# Patient Record
Sex: FEMALE | Race: BLACK OR AFRICAN AMERICAN | NOT HISPANIC OR LATINO | URBAN - METROPOLITAN AREA
[De-identification: names, ages, dates, MRNs, and addresses within clinical notes are randomized per-mention and may not be internally consistent; named-entity substitution may affect disease eponyms.]

---

## 2022-06-09 ENCOUNTER — OFFICE VISIT (OUTPATIENT)
Dept: URBAN - METROPOLITAN AREA CLINIC 118 | Facility: CLINIC | Age: 77
End: 2022-06-09
Payer: MEDICARE

## 2022-06-09 ENCOUNTER — LAB OUTSIDE AN ENCOUNTER (OUTPATIENT)
Dept: URBAN - METROPOLITAN AREA CLINIC 118 | Facility: CLINIC | Age: 77
End: 2022-06-09

## 2022-06-09 VITALS
DIASTOLIC BLOOD PRESSURE: 77 MMHG | WEIGHT: 189.2 LBS | BODY MASS INDEX: 31.52 KG/M2 | HEIGHT: 65 IN | HEART RATE: 56 BPM | TEMPERATURE: 97.7 F | SYSTOLIC BLOOD PRESSURE: 175 MMHG

## 2022-06-09 DIAGNOSIS — R10.30 LOWER ABDOMINAL PAIN: ICD-10-CM

## 2022-06-09 DIAGNOSIS — K62.5 RECTAL BLEEDING: ICD-10-CM

## 2022-06-09 PROCEDURE — 99214 OFFICE O/P EST MOD 30 MIN: CPT | Performed by: INTERNAL MEDICINE

## 2022-06-09 RX ORDER — DEXAMETHASONE 0.5 MG/1
1 TABLET TABLET ORAL
Status: ACTIVE | COMMUNITY

## 2022-06-09 RX ORDER — ALBUTEROL SULFATE 90 UG/1
AEROSOL, METERED RESPIRATORY (INHALATION)
Qty: 0 | Refills: 0 | Status: ACTIVE | COMMUNITY
Start: 1900-01-01

## 2022-06-09 NOTE — HPI-TODAY'S VISIT:
Reports having intermittent abdominal pain around Mother's day last month.   She had one episode of blood in the stools following the abdominal pain.   Feels low grade mid abdominal pain   No constipation, diarrhea, weight loss, vomiting.   States she was recently diagnosed with lichen planus for oral lesion

## 2022-06-14 LAB
A/G RATIO: 1.5
ALBUMIN: 4.2
ALKALINE PHOSPHATASE: 88
ALT (SGPT): 14
AST (SGOT): 17
BILIRUBIN, TOTAL: <0.2
BUN/CREATININE RATIO: 19
BUN: 17
CALCIUM: 9.7
CARBON DIOXIDE, TOTAL: 26
CHLORIDE: 100
CREATININE: 0.88
EGFR: 67
GLOBULIN, TOTAL: 2.8
GLUCOSE: 99
HEMATOCRIT: 45.3
HEMOGLOBIN: 13.5
MCH: 21.5
MCHC: 29.8
MCV: 72
NRBC: (no result)
PLATELETS: 300
POTASSIUM: 4.3
PROTEIN, TOTAL: 7
RBC: 6.29
RDW: 16.3
SODIUM: 142
WBC: 6.1

## 2022-06-29 ENCOUNTER — TELEPHONE ENCOUNTER (OUTPATIENT)
Dept: URBAN - METROPOLITAN AREA CLINIC 92 | Facility: CLINIC | Age: 77
End: 2022-06-29

## 2022-06-29 ENCOUNTER — OFFICE VISIT (OUTPATIENT)
Dept: URBAN - METROPOLITAN AREA SURGERY CENTER 23 | Facility: SURGERY CENTER | Age: 77
End: 2022-06-29

## 2022-06-29 RX ORDER — DEXAMETHASONE 0.5 MG/1
1 TABLET TABLET ORAL
Status: ACTIVE | COMMUNITY

## 2022-06-29 RX ORDER — ALBUTEROL SULFATE 90 UG/1
AEROSOL, METERED RESPIRATORY (INHALATION)
Qty: 0 | Refills: 0 | Status: ACTIVE | COMMUNITY
Start: 1900-01-01

## 2023-02-06 ENCOUNTER — LAB OUTSIDE AN ENCOUNTER (OUTPATIENT)
Dept: URBAN - METROPOLITAN AREA CLINIC 52 | Facility: CLINIC | Age: 78
End: 2023-02-06

## 2023-02-06 ENCOUNTER — CLAIMS CREATED FROM THE CLAIM WINDOW (OUTPATIENT)
Dept: URBAN - METROPOLITAN AREA CLINIC 52 | Facility: CLINIC | Age: 78
End: 2023-02-06
Payer: MEDICARE

## 2023-02-06 ENCOUNTER — WEB ENCOUNTER (OUTPATIENT)
Dept: URBAN - METROPOLITAN AREA CLINIC 52 | Facility: CLINIC | Age: 78
End: 2023-02-06

## 2023-02-06 VITALS
WEIGHT: 175.8 LBS | SYSTOLIC BLOOD PRESSURE: 146 MMHG | HEIGHT: 65 IN | TEMPERATURE: 97 F | OXYGEN SATURATION: 97 % | BODY MASS INDEX: 29.29 KG/M2 | HEART RATE: 96 BPM | DIASTOLIC BLOOD PRESSURE: 98 MMHG

## 2023-02-06 DIAGNOSIS — R93.5 ABNORMAL CT OF THE ABDOMEN: ICD-10-CM

## 2023-02-06 DIAGNOSIS — K62.5 RECTAL BLEEDING: ICD-10-CM

## 2023-02-06 DIAGNOSIS — K57.90 DIVERTICULOSIS: ICD-10-CM

## 2023-02-06 DIAGNOSIS — K76.0 FATTY LIVER: ICD-10-CM

## 2023-02-06 PROBLEM — 197321007: Status: ACTIVE | Noted: 2023-02-06

## 2023-02-06 PROCEDURE — 99204 OFFICE O/P NEW MOD 45 MIN: CPT | Performed by: INTERNAL MEDICINE

## 2023-02-06 PROCEDURE — 99214 OFFICE O/P EST MOD 30 MIN: CPT | Performed by: INTERNAL MEDICINE

## 2023-02-06 RX ORDER — ALBUTEROL SULFATE 90 UG/1
AEROSOL, METERED RESPIRATORY (INHALATION)
Qty: 0 | Refills: 0 | Status: ON HOLD | COMMUNITY
Start: 1900-01-01

## 2023-02-06 RX ORDER — POLYETHYLENE GLYCOL 3350, SODIUM CHLORIDE, SODIUM BICARBONATE, POTASSIUM CHLORIDE 420; 11.2; 5.72; 1.48 G/4L; G/4L; G/4L; G/4L
AS DIRECTED POWDER, FOR SOLUTION ORAL
Qty: 420 | Refills: 0 | OUTPATIENT
Start: 2023-02-06 | End: 2023-02-07

## 2023-02-06 RX ORDER — SPIRONOLACTONE 25 MG/1
1 TABLET TABLET, FILM COATED ORAL
Status: ACTIVE | COMMUNITY

## 2023-02-06 RX ORDER — DEXAMETHASONE 0.5 MG/1
1 TABLET TABLET ORAL
Status: ON HOLD | COMMUNITY

## 2023-02-06 RX ORDER — ONDANSETRON 4 MG/1
1 TABLET ON THE TONGUE AND ALLOW TO DISSOLVE TABLET, ORALLY DISINTEGRATING ORAL ONCE A DAY
Status: ACTIVE | COMMUNITY

## 2023-02-06 RX ORDER — APREMILAST 30 MG/1
1 TABLET TABLET, FILM COATED ORAL TWICE A DAY
Status: ACTIVE | COMMUNITY

## 2023-02-06 RX ORDER — EZETIMIBE 10 MG/1
1 TABLET TABLET ORAL ONCE A DAY
Status: ACTIVE | COMMUNITY

## 2023-02-06 RX ORDER — OLMESARTAN MEDOXOMIL 20 MG/1
1 TABLET TABLET, FILM COATED ORAL ONCE A DAY
Status: ACTIVE | COMMUNITY

## 2023-02-06 NOTE — HPI-TODAY'S VISIT:
12/21/22-Admitted hosp for rectal bleeding which ceased. At this time, no more bleeding or abd pain. CT-diverticulosis with mild stranding. Reports having intermittent abdominal pain for a year   Feels low grade mid abdominal pain  No constipation, diarrhea, weight loss, vomiting.  Recently diagnosed with lichen planus for oral lesion 2005-Mbganfpguky-HE x divertics [id number]-[id number]-LB. Pan divertics. 2019-EGD-Minor gastritis/duodenitis. 2019-MRI liver-2 probable hemangioma.

## 2023-02-08 PROBLEM — 397881000: Status: ACTIVE | Noted: 2023-02-06

## 2023-03-15 ENCOUNTER — OFFICE VISIT (OUTPATIENT)
Dept: URBAN - METROPOLITAN AREA SURGERY CENTER 17 | Facility: SURGERY CENTER | Age: 78
End: 2023-03-15

## 2023-06-13 ENCOUNTER — TELEPHONE ENCOUNTER (OUTPATIENT)
Dept: URBAN - METROPOLITAN AREA CLINIC 52 | Facility: CLINIC | Age: 78
End: 2023-06-13

## 2023-06-20 ENCOUNTER — OFFICE VISIT (OUTPATIENT)
Dept: URBAN - METROPOLITAN AREA CLINIC 17 | Facility: CLINIC | Age: 78
End: 2023-06-20

## 2023-06-20 RX ORDER — OLMESARTAN MEDOXOMIL 20 MG/1
1 TABLET TABLET, FILM COATED ORAL ONCE A DAY
COMMUNITY

## 2023-06-20 RX ORDER — APREMILAST 30 MG/1
1 TABLET TABLET, FILM COATED ORAL TWICE A DAY
COMMUNITY

## 2023-06-20 RX ORDER — EZETIMIBE 10 MG/1
1 TABLET TABLET ORAL ONCE A DAY
COMMUNITY

## 2023-06-20 RX ORDER — DEXAMETHASONE 0.5 MG/1
1 TABLET TABLET ORAL
Status: ON HOLD | COMMUNITY

## 2023-06-20 RX ORDER — ONDANSETRON 4 MG/1
1 TABLET ON THE TONGUE AND ALLOW TO DISSOLVE TABLET, ORALLY DISINTEGRATING ORAL ONCE A DAY
COMMUNITY

## 2023-06-20 RX ORDER — ALBUTEROL SULFATE 90 UG/1
AEROSOL, METERED RESPIRATORY (INHALATION)
Qty: 0 | Refills: 0 | Status: ON HOLD | COMMUNITY
Start: 1900-01-01

## 2023-06-20 RX ORDER — SPIRONOLACTONE 25 MG/1
1 TABLET TABLET, FILM COATED ORAL
COMMUNITY

## 2023-10-03 ENCOUNTER — LAB OUTSIDE AN ENCOUNTER (OUTPATIENT)
Dept: URBAN - METROPOLITAN AREA CLINIC 17 | Facility: CLINIC | Age: 78
End: 2023-10-03

## 2023-10-03 ENCOUNTER — OFFICE VISIT (OUTPATIENT)
Dept: URBAN - METROPOLITAN AREA CLINIC 17 | Facility: CLINIC | Age: 78
End: 2023-10-03
Payer: MEDICARE

## 2023-10-03 VITALS
DIASTOLIC BLOOD PRESSURE: 81 MMHG | SYSTOLIC BLOOD PRESSURE: 149 MMHG | BODY MASS INDEX: 28.66 KG/M2 | TEMPERATURE: 97.2 F | HEART RATE: 60 BPM | HEIGHT: 65 IN | WEIGHT: 172 LBS

## 2023-10-03 DIAGNOSIS — R93.5 ABNORMAL CT OF THE ABDOMEN: ICD-10-CM

## 2023-10-03 DIAGNOSIS — R63.4 WEIGHT LOSS: ICD-10-CM

## 2023-10-03 DIAGNOSIS — K62.5 RECTAL BLEEDING: ICD-10-CM

## 2023-10-03 DIAGNOSIS — K57.90 DIVERTICULOSIS: ICD-10-CM

## 2023-10-03 DIAGNOSIS — R19.7 DIARRHEA, UNSPECIFIED TYPE: ICD-10-CM

## 2023-10-03 DIAGNOSIS — R10.30 LOWER ABDOMINAL PAIN: ICD-10-CM

## 2023-10-03 PROCEDURE — 99215 OFFICE O/P EST HI 40 MIN: CPT | Performed by: INTERNAL MEDICINE

## 2023-10-03 RX ORDER — SPIRONOLACTONE 25 MG/1
1 TABLET TABLET, FILM COATED ORAL
Status: ACTIVE | COMMUNITY

## 2023-10-03 RX ORDER — OLMESARTAN MEDOXOMIL 20 MG/1
1 TABLET TABLET, FILM COATED ORAL ONCE A DAY
Status: ACTIVE | COMMUNITY

## 2023-10-03 RX ORDER — ALBUTEROL SULFATE 90 UG/1
AEROSOL, METERED RESPIRATORY (INHALATION)
Qty: 0 | Refills: 0 | Status: ON HOLD | COMMUNITY
Start: 1900-01-01

## 2023-10-03 RX ORDER — APREMILAST 30 MG/1
1 TABLET TABLET, FILM COATED ORAL TWICE A DAY
Status: ON HOLD | COMMUNITY

## 2023-10-03 RX ORDER — DEXAMETHASONE 0.5 MG/1
1 TABLET TABLET ORAL
Status: ACTIVE | COMMUNITY

## 2023-10-03 RX ORDER — EZETIMIBE 10 MG/1
1 TABLET TABLET ORAL ONCE A DAY
Status: ACTIVE | COMMUNITY

## 2023-10-03 NOTE — PHYSICAL EXAM RECTAL:
normal tone, no external hemorrhoids, no masses palpable, no red blood, Tenderness on DOLORES, Internal hemorrhoids present

## 2023-10-03 NOTE — HPI-TODAY'S VISIT:
Patient was in today had Preventice 30 day patch placed per Dr Sonya Kline. Patient given instructions and questions answered, patient verbalized understanding.       Estrella Aschoff, [place] This is an 77 yo female here for follow up of a December hospitalization at [facility].  She followed up with Dr. Romero February 2023.  A colonoscopy was scheduled but not performed.  The patient reports lower abdominal pain since December.  The severe abdominal pain has resolved.  She reports diarrhea and a change in her bowel habits since the bout of diverticulitis.  Stool evacuation has increased from one formed stool daily to 3 loose stools daily with urgency and occurs immediately after liquids and solids.  She has lost about 20 pounds since that time.  She denies rectal bleeding.   She alos reports abdominal pain with meals.  She admits to early satiety thus consumes small meals.

## 2023-10-04 LAB
ALBUMIN/GLOBULIN RATIO: 1.5
ALBUMIN: 4
ALKALINE PHOSPHATASE: 136
ALT: 14
AST: 15
BILIRUBIN, TOTAL: 0.4
BUN/CREATININE RATIO: (no result)
CA 19-9: 37
CALCIUM: 9.6
CARBON DIOXIDE: 31
CHLORIDE: 104
CREATININE: 0.77
GLOBULIN: 2.7
GLUCOSE: 87
HEMATOCRIT: 41.6
HEMOGLOBIN: 12.9
MCH: 21.8
MCHC: 31
MCV: 70.4
MPV: 10.9
PLATELET COUNT: 255
POTASSIUM: 5.1
PROTEIN, TOTAL: 6.7
RDW: 15
RED BLOOD CELL COUNT: 5.91
SODIUM: 141
UREA NITROGEN (BUN): 11
WHITE BLOOD CELL COUNT: 5.4

## 2023-12-08 ENCOUNTER — OFFICE VISIT (OUTPATIENT)
Dept: URBAN - METROPOLITAN AREA SURGERY CENTER 16 | Facility: SURGERY CENTER | Age: 78
End: 2023-12-08
Payer: MEDICARE

## 2023-12-08 ENCOUNTER — CLAIMS CREATED FROM THE CLAIM WINDOW (OUTPATIENT)
Dept: URBAN - METROPOLITAN AREA CLINIC 4 | Facility: CLINIC | Age: 78
End: 2023-12-08
Payer: MEDICARE

## 2023-12-08 DIAGNOSIS — R10.84 GENERALIZED ABDOMINAL PAIN: ICD-10-CM

## 2023-12-08 DIAGNOSIS — Z09 ENCOUNTER FOR FOLLOW-UP EXAMINATION AFTER COMPLETED TREATMENT FOR CONDITIONS OTHER THAN MALIGNANT NEOPLASM: ICD-10-CM

## 2023-12-08 DIAGNOSIS — K63.89 OTHER SPECIFIED DISEASES OF INTESTINE: ICD-10-CM

## 2023-12-08 DIAGNOSIS — R63.4 ABNORMAL WEIGHT LOSS: ICD-10-CM

## 2023-12-08 DIAGNOSIS — K31.89 OTHER DISEASES OF STOMACH AND DUODENUM: ICD-10-CM

## 2023-12-08 DIAGNOSIS — K57.30 DIVERTICULA OF COLON: ICD-10-CM

## 2023-12-08 DIAGNOSIS — K63.89 MELANOSIS, COLON: ICD-10-CM

## 2023-12-08 DIAGNOSIS — K57.32 DIVERTICULITIS OF LARGE INTESTINE WITHOUT PERFORATION OR ABSCESS WITHOUT BLEEDING: ICD-10-CM

## 2023-12-08 PROCEDURE — G8907 PT DOC NO EVENTS ON DISCHARG: HCPCS | Performed by: CLINIC/CENTER

## 2023-12-08 PROCEDURE — 45378 DIAGNOSTIC COLONOSCOPY: CPT | Performed by: INTERNAL MEDICINE

## 2023-12-08 PROCEDURE — 45378 DIAGNOSTIC COLONOSCOPY: CPT | Performed by: CLINIC/CENTER

## 2023-12-08 PROCEDURE — 88305 TISSUE EXAM BY PATHOLOGIST: CPT | Performed by: PATHOLOGY

## 2023-12-08 PROCEDURE — 00813 ANES UPR LWR GI NDSC PX: CPT | Performed by: ANESTHESIOLOGY

## 2023-12-08 PROCEDURE — 43239 EGD BIOPSY SINGLE/MULTIPLE: CPT | Performed by: INTERNAL MEDICINE

## 2023-12-08 PROCEDURE — 88312 SPECIAL STAINS GROUP 1: CPT | Performed by: PATHOLOGY

## 2023-12-08 PROCEDURE — 43239 EGD BIOPSY SINGLE/MULTIPLE: CPT | Performed by: CLINIC/CENTER

## 2024-01-23 ENCOUNTER — DASHBOARD ENCOUNTERS (OUTPATIENT)
Age: 79
End: 2024-01-23

## 2024-01-23 ENCOUNTER — OFFICE VISIT (OUTPATIENT)
Dept: URBAN - METROPOLITAN AREA CLINIC 17 | Facility: CLINIC | Age: 79
End: 2024-01-23
Payer: MEDICARE

## 2024-01-23 VITALS
TEMPERATURE: 97.3 F | DIASTOLIC BLOOD PRESSURE: 77 MMHG | HEART RATE: 83 BPM | WEIGHT: 170 LBS | BODY MASS INDEX: 28.32 KG/M2 | SYSTOLIC BLOOD PRESSURE: 121 MMHG | HEIGHT: 65 IN

## 2024-01-23 DIAGNOSIS — R19.7 DIARRHEA, UNSPECIFIED TYPE: ICD-10-CM

## 2024-01-23 DIAGNOSIS — K57.90 DIVERTICULOSIS: ICD-10-CM

## 2024-01-23 DIAGNOSIS — R63.4 WEIGHT LOSS: ICD-10-CM

## 2024-01-23 PROCEDURE — 99213 OFFICE O/P EST LOW 20 MIN: CPT | Performed by: INTERNAL MEDICINE

## 2024-01-23 RX ORDER — SPIRONOLACTONE 25 MG/1
1 TABLET TABLET, FILM COATED ORAL
Status: ACTIVE | COMMUNITY

## 2024-01-23 RX ORDER — EZETIMIBE 10 MG/1
1 TABLET TABLET ORAL ONCE A DAY
Status: ACTIVE | COMMUNITY

## 2024-01-23 RX ORDER — DEXAMETHASONE 0.5 MG/1
1 TABLET TABLET ORAL
Status: ACTIVE | COMMUNITY

## 2024-01-23 RX ORDER — OLMESARTAN MEDOXOMIL 20 MG/1
1 TABLET TABLET, FILM COATED ORAL ONCE A DAY
Status: ACTIVE | COMMUNITY

## 2024-01-23 NOTE — HPI-TODAY'S VISIT:
This is an 78-year-old female here for follow-up of diarrhea and weight loss.  An upper endoscopy and colonoscopy performed December 2023 was unremarkable.  Diverticular disease was noted.  Biopsies were not obtained at the time.  A CT scan of the abdomen and pelvis revealed diverticular disease, a kidney stone and multiple renal cysts but was otherwise unremarkable for cause of weight loss.  Stool studies were ordered but not done.  The patient continues to have loose stools after each meal.  Her weight has stabilized.  CA 19-9 was elevated to 37 but again CT scan of the abdomen and pelvis with contrast did not demonstrate a pancreatic tumor.

## 2024-02-06 ENCOUNTER — APPOINTMENT (RX ONLY)
Dept: URBAN - METROPOLITAN AREA CLINIC 33 | Facility: CLINIC | Age: 79
Setting detail: DERMATOLOGY
End: 2024-02-06

## 2024-02-06 DIAGNOSIS — L21.8 OTHER SEBORRHEIC DERMATITIS: ICD-10-CM

## 2024-02-06 DIAGNOSIS — L43.8 OTHER LICHEN PLANUS: ICD-10-CM

## 2024-02-06 PROCEDURE — ? COUNSELING

## 2024-02-06 PROCEDURE — ? PRESCRIPTION

## 2024-02-06 PROCEDURE — 99204 OFFICE O/P NEW MOD 45 MIN: CPT

## 2024-02-06 PROCEDURE — ? ADDITIONAL NOTES

## 2024-02-06 PROCEDURE — ? ORDER TESTS

## 2024-02-06 RX ORDER — DEXAMETHASONE 0.5 MG/5ML
SOLUTION ORAL
Qty: 50 | Refills: 3 | Status: ERX | COMMUNITY
Start: 2024-02-06

## 2024-02-06 RX ORDER — DESONIDE 0.5 MG/G
OINTMENT TOPICAL BID
Qty: 60 | Refills: 2 | Status: ERX | COMMUNITY
Start: 2024-02-06

## 2024-02-06 RX ADMIN — DESONIDE: 0.5 OINTMENT TOPICAL at 00:00

## 2024-02-06 RX ADMIN — DEXAMETHASONE: 0.5 SOLUTION ORAL at 00:00

## 2024-02-06 ASSESSMENT — LOCATION SIMPLE DESCRIPTION DERM
LOCATION SIMPLE: LEFT BUCCAL MUCOSA
LOCATION SIMPLE: RIGHT BUCCAL MUCOSA

## 2024-02-06 ASSESSMENT — BSA RASH: BSA RASH: 0

## 2024-02-06 ASSESSMENT — SEVERITY ASSESSMENT: SEVERITY: MILD TO MODERATE

## 2024-02-06 ASSESSMENT — PAIN INTENSITY VAS: HOW INTENSE IS YOUR PAIN 0 BEING NO PAIN, 10 BEING THE MOST SEVERE PAIN POSSIBLE?: NO PAIN

## 2024-02-06 ASSESSMENT — LOCATION DETAILED DESCRIPTION DERM
LOCATION DETAILED: RIGHT BUCCAL MUCOSA
LOCATION DETAILED: LEFT BUCCAL MUCOSA

## 2024-02-06 ASSESSMENT — LOCATION ZONE DERM: LOCATION ZONE: MUCOUS_MEMBRANE

## 2024-02-06 NOTE — PROCEDURE: ADDITIONAL NOTES
Additional Notes: Labs obtained in preparation for Plaquenil initiation. Patient to see Ophthalmologist for eye exam prior to initiation. Start topical steroid swish and spit for now.
Render Risk Assessment In Note?: no
Detail Level: Simple

## 2024-02-06 NOTE — PROCEDURE: ORDER TESTS
Expected Date Of Service: 02/06/2024
Billing Type: Third-Party Bill
Performing Laboratory: [id number]
Bill For Surgical Tray: no
Lab Facility: [id number]

## 2024-02-06 NOTE — HPI: EVALUATION OF SKIN LESION(S)
Hpi Title: Evaluation of Skin Lesions
Additional History: Patient has been diagnosed with lichen planus, and she c/o of her mouth getting sore. Patient states she only gets the lichen planus in her mouth. Patient also c/o nail not doing right, and a build up of eczema in her ears, patient has been given fluocinonide cream which keeps it under control. Patient states her nail has looked like this for 1 year. Patient continually clips it, and hoped for new nail to grow in. \\n\\nPatient has been treating the lichen planus with dexamethasone 0.5mg once daily.

## 2024-04-16 ENCOUNTER — OV EP (OUTPATIENT)
Dept: URBAN - METROPOLITAN AREA CLINIC 17 | Facility: CLINIC | Age: 79
End: 2024-04-16

## 2024-08-29 ENCOUNTER — TELEPHONE ENCOUNTER (OUTPATIENT)
Dept: URBAN - METROPOLITAN AREA CLINIC 118 | Facility: CLINIC | Age: 79
End: 2024-08-29

## 2024-08-29 ENCOUNTER — CLAIMS CREATED FROM THE CLAIM WINDOW (OUTPATIENT)
Dept: URBAN - METROPOLITAN AREA MEDICAL CENTER 16 | Facility: MEDICAL CENTER | Age: 79
End: 2024-08-29
Payer: MEDICARE

## 2024-08-29 DIAGNOSIS — R93.3 ABNORMAL FINDINGS ON DIAGNOSTIC IMAGING OF OTHER PARTS OF DIGESTIVE TRACT: ICD-10-CM

## 2024-08-29 PROCEDURE — 99254 IP/OBS CNSLTJ NEW/EST MOD 60: CPT | Performed by: INTERNAL MEDICINE

## 2024-09-24 ENCOUNTER — OFFICE VISIT (OUTPATIENT)
Dept: URBAN - METROPOLITAN AREA CLINIC 17 | Facility: CLINIC | Age: 79
End: 2024-09-24
Payer: MEDICARE

## 2024-09-24 VITALS
HEIGHT: 65 IN | BODY MASS INDEX: 31.36 KG/M2 | TEMPERATURE: 97.2 F | WEIGHT: 188.2 LBS | HEART RATE: 75 BPM | DIASTOLIC BLOOD PRESSURE: 92 MMHG | SYSTOLIC BLOOD PRESSURE: 155 MMHG

## 2024-09-24 DIAGNOSIS — R10.32 LLQ PAIN: ICD-10-CM

## 2024-09-24 DIAGNOSIS — K21.9 GERD WITHOUT ESOPHAGITIS: ICD-10-CM

## 2024-09-24 DIAGNOSIS — K57.92 DIVERTICULITIS: ICD-10-CM

## 2024-09-24 DIAGNOSIS — R09.A2 GLOBUS SENSATION: ICD-10-CM

## 2024-09-24 PROBLEM — 307496006: Status: ACTIVE | Noted: 2024-09-24

## 2024-09-24 PROBLEM — 266435005: Status: ACTIVE | Noted: 2024-09-24

## 2024-09-24 PROCEDURE — 99214 OFFICE O/P EST MOD 30 MIN: CPT | Performed by: INTERNAL MEDICINE

## 2024-09-24 RX ORDER — AZELASTINE HYDROCHLORIDE 137 UG/1
1 PUFF IN EACH NOSTRIL SPRAY, METERED NASAL TWICE A DAY
Status: ACTIVE | COMMUNITY

## 2024-09-24 RX ORDER — OLMESARTAN MEDOXOMIL 20 MG/1
1 TABLET TABLET, FILM COATED ORAL ONCE A DAY
Status: ACTIVE | COMMUNITY

## 2024-09-24 RX ORDER — AMOXICILLIN AND CLAVULANATE POTASSIUM 500; 125 MG/1; MG/1
1 TABLET TABLET, FILM COATED ORAL
Qty: 14 | OUTPATIENT
Start: 2024-09-24 | End: 2024-10-01

## 2024-09-24 RX ORDER — DEXAMETHASONE 0.5 MG/1
1 TABLET TABLET ORAL
Status: ACTIVE | COMMUNITY

## 2024-09-24 RX ORDER — EZETIMIBE 10 MG/1
1 TABLET TABLET ORAL ONCE A DAY
Status: ACTIVE | COMMUNITY

## 2024-09-24 RX ORDER — CETIRIZINE HYDROCHLORIDE 10 MG/1
1 TABLET TABLET, FILM COATED ORAL ONCE A DAY
Status: ACTIVE | COMMUNITY

## 2024-09-24 RX ORDER — SPIRONOLACTONE 25 MG/1
1 TABLET TABLET, FILM COATED ORAL
Status: ACTIVE | COMMUNITY

## 2024-09-24 RX ORDER — CYANOCOBALAMIN 1000 UG/ML
1 ML INJECTION INTRAMUSCULAR; SUBCUTANEOUS
Status: ACTIVE | COMMUNITY

## 2024-09-24 RX ORDER — FAMOTIDINE 40 MG/1
1 TABLET TABLET, FILM COATED ORAL ONCE A DAY
Qty: 30 | Refills: 11 | OUTPATIENT
Start: 2024-09-24

## 2024-09-24 NOTE — HPI-TODAY'S VISIT:
This is an 80 yo F here for follow up.  She has a PMHx of Lichen planuus and pandiverticular disease.  She was admitted to [facility] for LLQ and black stools .  A CT scan of the Abd revealed mild diverticulitis.   She was discharged after 24 hours on ABX.  She did not take the ABX at home because it makes her ill.  Instead she did a liquid diet.  An EGD/colonoscopy December 2023 was performed for weight loss and screening.  The colonoscopy revealed pandiverticular disease.  The EGD was unremarkable.   The patient also complaining of a globus sensation.   Per her reports a barium swallow ordered by her PCP was unremarkable ([place]).  She was placed on a PPI in the past but stopped taking the meds due to diarrhea.  She is generally adverse to meds unless absolutely necessary.

## 2024-09-24 NOTE — PHYSICAL EXAM RECTAL:
normal tone, no external hemorrhoids, no masses palpable, no red blood, Tenderness on DOLORES, Internal hemorrhoids present
no

## 2024-11-15 ENCOUNTER — TELEPHONE ENCOUNTER (OUTPATIENT)
Dept: URBAN - METROPOLITAN AREA CLINIC 17 | Facility: CLINIC | Age: 79
End: 2024-11-15

## 2024-11-19 ENCOUNTER — OFFICE VISIT (OUTPATIENT)
Dept: URBAN - METROPOLITAN AREA CLINIC 17 | Facility: CLINIC | Age: 79
End: 2024-11-19
Payer: MEDICARE

## 2024-11-19 VITALS
TEMPERATURE: 97.5 F | WEIGHT: 185 LBS | SYSTOLIC BLOOD PRESSURE: 143 MMHG | BODY MASS INDEX: 30.82 KG/M2 | HEART RATE: 73 BPM | HEIGHT: 65 IN | DIASTOLIC BLOOD PRESSURE: 82 MMHG

## 2024-11-19 DIAGNOSIS — R09.A2 GLOBUS SENSATION: ICD-10-CM

## 2024-11-19 DIAGNOSIS — K21.9 GERD WITHOUT ESOPHAGITIS: ICD-10-CM

## 2024-11-19 DIAGNOSIS — R10.32 LLQ PAIN: ICD-10-CM

## 2024-11-19 DIAGNOSIS — K57.92 DIVERTICULITIS: ICD-10-CM

## 2024-11-19 PROCEDURE — 99212 OFFICE O/P EST SF 10 MIN: CPT | Performed by: INTERNAL MEDICINE

## 2024-11-19 RX ORDER — DEXAMETHASONE 0.5 MG/1
1 TABLET TABLET ORAL
Status: ACTIVE | COMMUNITY

## 2024-11-19 RX ORDER — FAMOTIDINE 40 MG/1
1 TABLET TABLET, FILM COATED ORAL ONCE A DAY
Qty: 30 | Refills: 11 | Status: ACTIVE | COMMUNITY
Start: 2024-09-24

## 2024-11-19 RX ORDER — FAMOTIDINE 40 MG/1
1 TABLET TABLET, FILM COATED ORAL ONCE A DAY
Qty: 90 TABLET | Refills: 3 | OUTPATIENT

## 2024-11-19 RX ORDER — CYANOCOBALAMIN 1000 UG/ML
1 ML INJECTION INTRAMUSCULAR; SUBCUTANEOUS
Status: ACTIVE | COMMUNITY

## 2024-11-19 RX ORDER — SPIRONOLACTONE 25 MG/1
1 TABLET TABLET, FILM COATED ORAL
Status: ACTIVE | COMMUNITY

## 2024-11-19 RX ORDER — OLMESARTAN MEDOXOMIL 20 MG/1
1 TABLET TABLET, FILM COATED ORAL ONCE A DAY
Status: ACTIVE | COMMUNITY

## 2024-11-19 RX ORDER — EZETIMIBE 10 MG/1
1 TABLET TABLET ORAL ONCE A DAY
Status: ACTIVE | COMMUNITY

## 2024-11-19 RX ORDER — CETIRIZINE HYDROCHLORIDE 10 MG/1
1 TABLET TABLET, FILM COATED ORAL ONCE A DAY
Status: ACTIVE | COMMUNITY

## 2024-11-19 RX ORDER — AZELASTINE HYDROCHLORIDE 137 UG/1
1 PUFF IN EACH NOSTRIL SPRAY, METERED NASAL TWICE A DAY
Status: ACTIVE | COMMUNITY

## 2024-11-19 NOTE — HPI-TODAY'S VISIT:
(September 2024) This is an 78 yo F here for follow up.  She has a PMHx of Lichen planuus and pandiverticular disease.  She was admitted to [facility] for LLQ and black stools .  A CT scan of the Abd revealed mild diverticulitis.   She was discharged after 24 hours on ABX.  She did not take the ABX at home because it makes her ill.  Instead she did a liquid diet.  An EGD/colonoscopy December 2023 was performed for weight loss and screening.  The colonoscopy revealed pandiverticular disease.  The EGD was unremarkable.   The patient also complaining of a globus sensation.   Per her reports a barium swallow ordered by her PCP was unremarkable ([place]).  She was placed on a PPI in the past but stopped taking the meds due to diarrhea.  She is generally adverse to meds unless absolutely necessary.

## 2024-11-19 NOTE — HPI-FREE TEXT
Today:  November 19, 2024 This is an 78 yo F here for follow up.  She completed a one week course of ABX for diverticulitis which resolved her symptoms.   Nightly famotidine has relieved the globus sensation.  She no longer has to take olives and vinegar.  She ha not GI complaints at this time.